# Patient Record
Sex: MALE | Race: WHITE | Employment: FULL TIME | URBAN - METROPOLITAN AREA
[De-identification: names, ages, dates, MRNs, and addresses within clinical notes are randomized per-mention and may not be internally consistent; named-entity substitution may affect disease eponyms.]

---

## 2020-10-27 ENCOUNTER — TELEPHONE (OUTPATIENT)
Dept: FAMILY MEDICINE | Facility: CLINIC | Age: 50
End: 2020-10-27

## 2020-10-27 NOTE — TELEPHONE ENCOUNTER
Reason for Call:  Other     Detailed comments: Apryl, RN Case Manager for [employer] calls stating Jordan was discharged from [facility] on 10/23.  When she called to check on on him today he states he is having pain, belly aches and confusion.  Apryl was unclear of who his PCP was and thinks she gave him and incorrect number to contact for follow up.  Apryl asks if someone can reach out to him to for follow up.  Jordan was hospitalized for a cut to his leg and ETOH use.  He will also need to be seen for suture removal.    Phone Number Patient can be reached at: [phone]    Best Time: any    Can we leave a detailed message on this number? YES    Call taken on 10/27/2020 at 4:11 PM by Coral Guallpa

## 2020-10-28 NOTE — TELEPHONE ENCOUNTER
Please call patient to triage symptoms. Patient has no PCP in our system. Last seen by Lani in 2016 and by me 10 years ago. He is a new patient.If he needs follow up it should be in person, as he is new and it can be with any provider with availability.  Wisam Roque MD

## 2020-10-28 NOTE — TELEPHONE ENCOUNTER
"Attempted to call patient, message on phone said \"the person you have called is unable to receive calls at this time\" and hung up.     NAYA DaleyN, RN  [facility]    "

## 2020-10-29 NOTE — TELEPHONE ENCOUNTER
"Attempted to call patient, message on phone said \"the person you have called is unable to receive calls at this time\" and hung up.    NAYA DaleyN, RN  [facility]    "

## 2020-10-30 NOTE — TELEPHONE ENCOUNTER
"Attempted to call the patient, message on phone said \"the person you have called is unable to receive calls at this time\" and hung up.    NAYA DaleyN, RN  [facility]    "